# Patient Record
Sex: FEMALE | URBAN - METROPOLITAN AREA
[De-identification: names, ages, dates, MRNs, and addresses within clinical notes are randomized per-mention and may not be internally consistent; named-entity substitution may affect disease eponyms.]

---

## 2022-10-01 ENCOUNTER — APPOINTMENT (RX ONLY)
Dept: URBAN - METROPOLITAN AREA CLINIC 55 | Facility: CLINIC | Age: 27
Setting detail: DERMATOLOGY
End: 2022-10-01

## 2022-10-01 DIAGNOSIS — Z41.9 ENCOUNTER FOR PROCEDURE FOR PURPOSES OTHER THAN REMEDYING HEALTH STATE, UNSPECIFIED: ICD-10-CM

## 2022-10-01 PROCEDURE — ? INVENTORY

## 2022-10-01 PROCEDURE — ? SIGNATURE HYDRAFACIAL

## 2022-10-01 ASSESSMENT — LOCATION DETAILED DESCRIPTION DERM: LOCATION DETAILED: GLABELLA

## 2022-10-01 ASSESSMENT — LOCATION ZONE DERM: LOCATION ZONE: FACE

## 2022-10-01 ASSESSMENT — LOCATION SIMPLE DESCRIPTION DERM: LOCATION SIMPLE: GLABELLA

## 2022-10-01 NOTE — PROCEDURE: SIGNATURE HYDRAFACIAL
Solution Override
Location: face
Vacuum Pressure High Setting (Will Not Render If Set To 0): 0
Consent: Written consent obtained, risks reviewed including but not limited to crusting, scabbing, blistering, scarring, darker or lighter pigmentary change, bruising, and/or incomplete response.
Tip: Hydropeel Tip, Teal
Tip: Hydropeel Tip, Clear
Procedure: Peel
Post-Care Instructions: I reviewed with the patient in detail post-care instructions. Patient should stay away from the sun and wear sun protection until treated areas are fully healed.
Solution: Activ-4
Treatment Number: 1
Tip Override
Procedure: Boost
Procedure: Extraction
Procedure: Extend and Protect
Solution: GlySal 7.5%
Indication: dehydrated skin
Procedure: Fusion
Tip: Hydropeel Tip, Blue
Solution: Beta-HD
Procedure: Exfoliation